# Patient Record
Sex: FEMALE | Race: WHITE | NOT HISPANIC OR LATINO | Employment: OTHER | URBAN - METROPOLITAN AREA
[De-identification: names, ages, dates, MRNs, and addresses within clinical notes are randomized per-mention and may not be internally consistent; named-entity substitution may affect disease eponyms.]

---

## 2024-08-16 ENCOUNTER — HOSPITAL ENCOUNTER (EMERGENCY)
Facility: MEDICAL CENTER | Age: 54
End: 2024-08-16
Payer: MEDICARE

## 2024-08-16 VITALS
WEIGHT: 209 LBS | HEART RATE: 92 BPM | DIASTOLIC BLOOD PRESSURE: 93 MMHG | OXYGEN SATURATION: 96 % | SYSTOLIC BLOOD PRESSURE: 154 MMHG | RESPIRATION RATE: 18 BRPM | BODY MASS INDEX: 37.03 KG/M2 | HEIGHT: 63 IN | TEMPERATURE: 97.5 F

## 2024-08-16 LAB — EKG IMPRESSION: NORMAL

## 2024-08-16 PROCEDURE — 93005 ELECTROCARDIOGRAM TRACING: CPT

## 2024-08-16 PROCEDURE — 302449 STATCHG TRIAGE ONLY (STATISTIC)

## 2024-08-16 ASSESSMENT — FIBROSIS 4 INDEX
FIB4 SCORE: 1.57
FIB4 SCORE: 1.57

## 2024-08-16 NOTE — ED TRIAGE NOTES
"Pt. States she got in touch with pain management and states that pump is empty \"there's noting you can do to help me\". Pt. Asked if she would like to be evaluated for HTN/UTI and states she is not interested in this. GCS is 15. Ambulates with steady gait. NAD. LWBS.   "